# Patient Record
Sex: MALE | Race: WHITE | NOT HISPANIC OR LATINO | URBAN - METROPOLITAN AREA
[De-identification: names, ages, dates, MRNs, and addresses within clinical notes are randomized per-mention and may not be internally consistent; named-entity substitution may affect disease eponyms.]

---

## 2017-03-01 ENCOUNTER — OUTPATIENT (OUTPATIENT)
Dept: OUTPATIENT SERVICES | Facility: HOSPITAL | Age: 52
LOS: 1 days | End: 2017-03-01
Payer: COMMERCIAL

## 2017-03-01 DIAGNOSIS — R06.00 DYSPNEA, UNSPECIFIED: ICD-10-CM

## 2017-03-01 PROCEDURE — 93017 CV STRESS TEST TRACING ONLY: CPT

## 2017-03-01 PROCEDURE — 78452 HT MUSCLE IMAGE SPECT MULT: CPT

## 2017-03-01 PROCEDURE — A9502: CPT

## 2017-08-24 ENCOUNTER — OUTPATIENT (OUTPATIENT)
Dept: OUTPATIENT SERVICES | Facility: HOSPITAL | Age: 52
LOS: 1 days | End: 2017-08-24
Payer: COMMERCIAL

## 2017-08-24 VITALS
WEIGHT: 201.06 LBS | DIASTOLIC BLOOD PRESSURE: 82 MMHG | SYSTOLIC BLOOD PRESSURE: 160 MMHG | RESPIRATION RATE: 18 BRPM | HEART RATE: 68 BPM | HEIGHT: 66 IN | OXYGEN SATURATION: 97 % | TEMPERATURE: 98 F

## 2017-08-24 DIAGNOSIS — J45.909 UNSPECIFIED ASTHMA, UNCOMPLICATED: ICD-10-CM

## 2017-08-24 DIAGNOSIS — G56.00 CARPAL TUNNEL SYNDROME, UNSPECIFIED UPPER LIMB: ICD-10-CM

## 2017-08-24 DIAGNOSIS — G47.33 OBSTRUCTIVE SLEEP APNEA (ADULT) (PEDIATRIC): ICD-10-CM

## 2017-08-24 DIAGNOSIS — E78.5 HYPERLIPIDEMIA, UNSPECIFIED: ICD-10-CM

## 2017-08-24 DIAGNOSIS — I10 ESSENTIAL (PRIMARY) HYPERTENSION: ICD-10-CM

## 2017-08-24 DIAGNOSIS — Z98.890 OTHER SPECIFIED POSTPROCEDURAL STATES: Chronic | ICD-10-CM

## 2017-08-24 DIAGNOSIS — G56.02 CARPAL TUNNEL SYNDROME, LEFT UPPER LIMB: ICD-10-CM

## 2017-08-24 DIAGNOSIS — M65.312 TRIGGER THUMB, LEFT THUMB: ICD-10-CM

## 2017-08-24 DIAGNOSIS — Z01.818 ENCOUNTER FOR OTHER PREPROCEDURAL EXAMINATION: ICD-10-CM

## 2017-08-24 DIAGNOSIS — F41.9 ANXIETY DISORDER, UNSPECIFIED: ICD-10-CM

## 2017-08-24 DIAGNOSIS — Z90.89 ACQUIRED ABSENCE OF OTHER ORGANS: Chronic | ICD-10-CM

## 2017-08-24 PROCEDURE — G0463: CPT

## 2017-08-24 NOTE — H&P PST ADULT - NEGATIVE PSYCHIATRIC SYMPTOMS
no visual hallucinations/no suicidal ideation/no auditory hallucinations/no memory loss/no mood swings

## 2017-08-24 NOTE — H&P PST ADULT - NEGATIVE MUSCULOSKELETAL SYMPTOMS
no leg pain R/no back pain/no joint swelling/no arm pain L/no arm pain R/no neck pain/no leg pain L/no arthritis

## 2017-08-24 NOTE — H&P PST ADULT - HISTORY OF PRESENT ILLNESS
This is a  53 y/o male c/o tingling and numbness left hand , seen MD for evaluation, nerve conduction study revealed carpal tunnel, he presents today for surgery This is a  51 y/o male c/o tingling and numbness left hand , seen MD for evaluation, nerve conduction study revealed carpal tunnel, and also c/o left thumb trigger finger,  he presents today for surgery

## 2017-08-24 NOTE — H&P PST ADULT - PMH
Anxiety and depression    Asthma  x 10 years, stable  HTN (hypertension)  x 8 years, controlled  Hyperlipidemia

## 2017-08-24 NOTE — H&P PST ADULT - FAMILY HISTORY
Mother  Still living? No  Family history of CVA, Age at diagnosis: Age Unknown     Father  Still living? Yes, Estimated age: Age Unknown  Family history of hyperlipidemia, Age at diagnosis: Age Unknown     Sibling  Still living? Yes, Estimated age: Age Unknown  Family history of hypertension, Age at diagnosis: Age Unknown

## 2017-08-24 NOTE — H&P PST ADULT - ASSESSMENT
carpal tunnel syndrome, left upper limb carpal tunnel syndrome, left upper limb  trigger finger, left thumb

## 2017-08-24 NOTE — H&P PST ADULT - NSANTHOSAYNRD_GEN_A_CORE
No. K[place] screening performed.  STOP BANG Legend: 0-2 = LOW Risk; 3-4 = INTERMEDIATE Risk; 5-8 = HIGH Risk

## 2017-08-24 NOTE — H&P PST ADULT - NEGATIVE CARDIOVASCULAR SYMPTOMS
no claudication/no peripheral edema/no chest pain/no palpitations/no orthopnea/no dyspnea on exertion

## 2017-08-30 ENCOUNTER — OUTPATIENT (OUTPATIENT)
Dept: OUTPATIENT SERVICES | Facility: HOSPITAL | Age: 52
LOS: 1 days | Discharge: HOME | End: 2017-08-30
Payer: COMMERCIAL

## 2017-08-30 VITALS
DIASTOLIC BLOOD PRESSURE: 77 MMHG | HEART RATE: 59 BPM | SYSTOLIC BLOOD PRESSURE: 118 MMHG | RESPIRATION RATE: 16 BRPM | OXYGEN SATURATION: 95 % | TEMPERATURE: 98 F

## 2017-08-30 VITALS
HEART RATE: 76 BPM | OXYGEN SATURATION: 97 % | RESPIRATION RATE: 16 BRPM | TEMPERATURE: 98 F | DIASTOLIC BLOOD PRESSURE: 87 MMHG | WEIGHT: 201.06 LBS | HEIGHT: 66 IN | SYSTOLIC BLOOD PRESSURE: 140 MMHG

## 2017-08-30 DIAGNOSIS — Z98.890 OTHER SPECIFIED POSTPROCEDURAL STATES: Chronic | ICD-10-CM

## 2017-08-30 DIAGNOSIS — G56.02 CARPAL TUNNEL SYNDROME, LEFT UPPER LIMB: ICD-10-CM

## 2017-08-30 DIAGNOSIS — M65.312 TRIGGER THUMB, LEFT THUMB: ICD-10-CM

## 2017-08-30 DIAGNOSIS — Z90.89 ACQUIRED ABSENCE OF OTHER ORGANS: Chronic | ICD-10-CM

## 2017-08-30 PROCEDURE — 64721 CARPAL TUNNEL SURGERY: CPT | Mod: LT

## 2017-08-30 PROCEDURE — 88304 TISSUE EXAM BY PATHOLOGIST: CPT | Mod: 26

## 2017-08-30 PROCEDURE — 26055 INCISE FINGER TENDON SHEATH: CPT | Mod: F3

## 2017-08-30 PROCEDURE — 88304 TISSUE EXAM BY PATHOLOGIST: CPT

## 2017-08-30 PROCEDURE — 88305 TISSUE EXAM BY PATHOLOGIST: CPT | Mod: 26

## 2017-08-30 PROCEDURE — 88305 TISSUE EXAM BY PATHOLOGIST: CPT

## 2017-08-30 RX ORDER — OXYCODONE HYDROCHLORIDE 5 MG/1
2 TABLET ORAL
Qty: 30 | Refills: 0 | OUTPATIENT
Start: 2017-08-30

## 2017-08-30 RX ORDER — SODIUM CHLORIDE 9 MG/ML
3 INJECTION INTRAMUSCULAR; INTRAVENOUS; SUBCUTANEOUS EVERY 8 HOURS
Qty: 0 | Refills: 0 | Status: DISCONTINUED | OUTPATIENT
Start: 2017-08-30 | End: 2017-08-30

## 2017-08-30 RX ORDER — SODIUM CHLORIDE 9 MG/ML
1000 INJECTION, SOLUTION INTRAVENOUS
Qty: 0 | Refills: 0 | Status: DISCONTINUED | OUTPATIENT
Start: 2017-08-30 | End: 2017-09-07

## 2017-08-30 RX ORDER — HYDROMORPHONE HYDROCHLORIDE 2 MG/ML
0.5 INJECTION INTRAMUSCULAR; INTRAVENOUS; SUBCUTANEOUS
Qty: 0 | Refills: 0 | Status: DISCONTINUED | OUTPATIENT
Start: 2017-08-30 | End: 2017-08-30

## 2017-08-30 RX ADMIN — SODIUM CHLORIDE 3 MILLILITER(S): 9 INJECTION INTRAMUSCULAR; INTRAVENOUS; SUBCUTANEOUS at 07:22

## 2017-08-30 NOTE — BRIEF OPERATIVE NOTE - POST-OP DX
Carpal tunnel syndrome of left wrist  08/30/2017  And trigger finger left 1st,3rd and 4th digits  Active  Dominguez Carr

## 2017-08-30 NOTE — BRIEF OPERATIVE NOTE - PROCEDURE
Carpal tunnel release, left  08/30/2017  and trigger finger release left 1st,3rd and 4th digits  Active  SPILLAI

## 2017-08-30 NOTE — ASU DISCHARGE PLAN (ADULT/PEDIATRIC). - MEDICATION SUMMARY - MEDICATIONS TO TAKE
I will START or STAY ON the medications listed below when I get home from the hospital:    acetaminophen-oxyCODONE 325 mg-5 mg oral tablet  -- 2 tab(s) by mouth every 4 hours, As Needed MDD:8  -- Caution federal law prohibits the transfer of this drug to any person other  than the person for whom it was prescribed.  May cause drowsiness.  Alcohol may intensify this effect.  Use care when operating dangerous machinery.  This prescription cannot be refilled.  This product contains acetaminophen.  Do not use  with any other product containing acetaminophen to prevent possible liver damage.  Using more of this medication than prescribed may cause serious breathing problems.    -- Indication: For pain    gabapentin 100 mg oral capsule  -- 3 cap(s) by mouth once a day (at bedtime)  -- Indication: For as directed    sertraline 100 mg oral tablet  -- 1 tab(s) by mouth once a day  -- Indication: For as directed    simvastatin 40 mg oral tablet  -- 1 tab(s) by mouth once a day (at bedtime)  -- Indication: For as directed    Bystolic 10 mg oral tablet  -- 1 tab(s) by mouth once a day  -- Indication: For as directed    ProAir HFA 90 mcg/inh inhalation aerosol  -- 2 puff(s) inhaled 4 times a day, As Needed  -- Indication: For as directed    felodipine 10 mg oral tablet, extended release  -- 1 tab(s) by mouth once a day  -- Indication: For as directed    hydroCHLOROthiazide 25 mg oral tablet  -- 1 tab(s) by mouth once a day  -- Indication: For as directed

## 2017-08-30 NOTE — BRIEF OPERATIVE NOTE - PRE-OP DX
Carpal tunnel syndrome of left wrist  08/30/2017  and trigger finger left 1st,3rd,and 4th digits  Active  Dominguez Carr

## 2017-08-30 NOTE — ASU PREOP CHECKLIST - WAS PATIENT ON BETA BLOCKER?
"Chief Complaint   Patient presents with     RECHECK     Follow up to discuss overnight oximeter results       Initial Ht 1.626 m (5' 4\")  Wt 112.9 kg (249 lb)  BMI 42.74 kg/m2 Estimated body mass index is 42.74 kg/(m^2) as calculated from the following:    Height as of this encounter: 1.626 m (5' 4\").    Weight as of this encounter: 112.9 kg (249 lb).  Medication Reconciliation: complete     JONNATHAN Mark        "
No

## 2017-09-01 LAB — SURGICAL PATHOLOGY FINAL REPORT - CH: SIGNIFICANT CHANGE UP

## 2017-09-06 DIAGNOSIS — M65.842 OTHER SYNOVITIS AND TENOSYNOVITIS, LEFT HAND: ICD-10-CM

## 2017-09-06 DIAGNOSIS — I10 ESSENTIAL (PRIMARY) HYPERTENSION: ICD-10-CM

## 2017-09-06 DIAGNOSIS — M65.312 TRIGGER THUMB, LEFT THUMB: ICD-10-CM

## 2017-09-06 DIAGNOSIS — G56.02 CARPAL TUNNEL SYNDROME, LEFT UPPER LIMB: ICD-10-CM

## 2017-09-06 DIAGNOSIS — E78.5 HYPERLIPIDEMIA, UNSPECIFIED: ICD-10-CM

## 2017-09-06 DIAGNOSIS — F32.9 MAJOR DEPRESSIVE DISORDER, SINGLE EPISODE, UNSPECIFIED: ICD-10-CM

## 2017-09-06 DIAGNOSIS — J45.909 UNSPECIFIED ASTHMA, UNCOMPLICATED: ICD-10-CM

## 2017-09-06 DIAGNOSIS — M65.332 TRIGGER FINGER, LEFT MIDDLE FINGER: ICD-10-CM

## 2017-09-06 DIAGNOSIS — M65.342 TRIGGER FINGER, LEFT RING FINGER: ICD-10-CM

## 2017-09-06 DIAGNOSIS — F41.9 ANXIETY DISORDER, UNSPECIFIED: ICD-10-CM

## 2017-09-06 DIAGNOSIS — Z87.891 PERSONAL HISTORY OF NICOTINE DEPENDENCE: ICD-10-CM

## 2017-09-19 ENCOUNTER — OUTPATIENT (OUTPATIENT)
Dept: OUTPATIENT SERVICES | Facility: HOSPITAL | Age: 52
LOS: 1 days | End: 2017-09-19
Payer: COMMERCIAL

## 2017-09-19 VITALS
TEMPERATURE: 98 F | OXYGEN SATURATION: 98 % | HEART RATE: 72 BPM | SYSTOLIC BLOOD PRESSURE: 156 MMHG | RESPIRATION RATE: 16 BRPM | DIASTOLIC BLOOD PRESSURE: 91 MMHG | HEIGHT: 66 IN | WEIGHT: 195.99 LBS

## 2017-09-19 DIAGNOSIS — Z98.890 OTHER SPECIFIED POSTPROCEDURAL STATES: Chronic | ICD-10-CM

## 2017-09-19 DIAGNOSIS — G56.01 CARPAL TUNNEL SYNDROME, RIGHT UPPER LIMB: ICD-10-CM

## 2017-09-19 DIAGNOSIS — I10 ESSENTIAL (PRIMARY) HYPERTENSION: ICD-10-CM

## 2017-09-19 DIAGNOSIS — Z01.818 ENCOUNTER FOR OTHER PREPROCEDURAL EXAMINATION: ICD-10-CM

## 2017-09-19 DIAGNOSIS — J45.909 UNSPECIFIED ASTHMA, UNCOMPLICATED: ICD-10-CM

## 2017-09-19 DIAGNOSIS — Z90.89 ACQUIRED ABSENCE OF OTHER ORGANS: Chronic | ICD-10-CM

## 2017-09-19 LAB
ANION GAP SERPL CALC-SCNC: 7 MMOL/L — SIGNIFICANT CHANGE UP (ref 5–17)
BUN SERPL-MCNC: 13 MG/DL — SIGNIFICANT CHANGE UP (ref 7–18)
CALCIUM SERPL-MCNC: 9.2 MG/DL — SIGNIFICANT CHANGE UP (ref 8.4–10.5)
CHLORIDE SERPL-SCNC: 104 MMOL/L — SIGNIFICANT CHANGE UP (ref 96–108)
CO2 SERPL-SCNC: 29 MMOL/L — SIGNIFICANT CHANGE UP (ref 22–31)
CREAT SERPL-MCNC: 0.99 MG/DL — SIGNIFICANT CHANGE UP (ref 0.5–1.3)
GLUCOSE SERPL-MCNC: 102 MG/DL — HIGH (ref 70–99)
POTASSIUM SERPL-MCNC: 3.2 MMOL/L — LOW (ref 3.5–5.3)
POTASSIUM SERPL-SCNC: 3.2 MMOL/L — LOW (ref 3.5–5.3)
SODIUM SERPL-SCNC: 140 MMOL/L — SIGNIFICANT CHANGE UP (ref 135–145)

## 2017-09-19 PROCEDURE — G0463: CPT

## 2017-09-19 PROCEDURE — 80048 BASIC METABOLIC PNL TOTAL CA: CPT

## 2017-09-19 RX ORDER — SODIUM CHLORIDE 9 MG/ML
3 INJECTION INTRAMUSCULAR; INTRAVENOUS; SUBCUTANEOUS EVERY 8 HOURS
Qty: 0 | Refills: 0 | Status: DISCONTINUED | OUTPATIENT
Start: 2017-09-20 | End: 2017-09-28

## 2017-09-19 RX ORDER — ACETAMINOPHEN 500 MG
975 TABLET ORAL ONCE
Qty: 0 | Refills: 0 | Status: DISCONTINUED | OUTPATIENT
Start: 2017-09-20 | End: 2017-09-28

## 2017-09-19 RX ORDER — CELECOXIB 200 MG/1
200 CAPSULE ORAL ONCE
Qty: 0 | Refills: 0 | Status: DISCONTINUED | OUTPATIENT
Start: 2017-09-20 | End: 2017-09-28

## 2017-09-19 NOTE — H&P PST ADULT - PROBLEM SELECTOR PLAN 1
Scheduled for right carpal tunnel release on 9/20/2017. Preoperative instructions discussed and given to patient. Verbalized understanding of instructions

## 2017-09-19 NOTE — H&P PST ADULT - PSH
S/P arthroscopy of knee  bilateral (left 2008, right 2016)  S/P carpal tunnel release  left hand - 8/2017  S/P hernia repair  1984  S/P tonsillectomy

## 2017-09-19 NOTE — H&P PST ADULT - ASSESSMENT
51 y/o male with PMH of hypertension, hyperlipidemia, asthma, depression is here today for presurgical evaluation prior to surgery. Complains of burning tingling and numbness of right hand for over ten years. He was diagnosed with carpal tunnel syndrome and is scheduled for right carpal tunnel release on 9/20/2017 51 y/o male with PMH of hypertension, hyperlipidemia, asthma uncomplicated, depression (stable on medication), and prediabetes diagnosed with carpal tunnel syndrome scheduled for right carpal tunnel release on 9/20/2017. Preoperative instructions discussed and given to patient. Verbalized understanding of instructions

## 2017-09-19 NOTE — H&P PST ADULT - NEGATIVE GENERAL SYMPTOMS
no anorexia/no weight loss/no chills/no sweating/no malaise/no fatigue/no fever/no weight gain/no polyphagia/no polyuria/no polydipsia

## 2017-09-19 NOTE — H&P PST ADULT - PMH
Anxiety and depression    Asthma  x 10 years, stable  HTN (hypertension)  x 8 years, controlled  Hyperlipidemia    Seasonal allergic rhinitis, unspecified chronicity, unspecified trigger

## 2017-09-19 NOTE — H&P PST ADULT - HISTORY OF PRESENT ILLNESS
51 y/o male with PMH of hypertension, hyperlipidemia, asthma, depression is here today for presurgical evaluation prior to surgery. Complains of burning tingling and numbness of right hand for over ten years. He was diagnosed with carpal tunnel syndrome and is scheduled for right carpal tunnel release on 9/20/2017

## 2017-09-19 NOTE — H&P PST ADULT - PROBLEM SELECTOR PLAN 2
Instructed to take antihypertensive medication as prescribed with a sip of water the morning of surgery and to follow up with PCP for management of hypertension and other comorbid conditions

## 2017-09-19 NOTE — H&P PST ADULT - LAST ECHOCARDIOGRAM
2/2017: reports normal heart function 2/2017: Grade 1 diastolic dysfunction, mild tricuspid, trace mitral, trace pulmonic regurgitation, LVEF 60%

## 2017-09-19 NOTE — H&P PST ADULT - PROBLEM SELECTOR PLAN 3
Instructed to use ProAir as needed the morning of surgery and to bring medication with him the morning of surgery

## 2017-09-19 NOTE — H&P PST ADULT - RS GEN PE MLT RESP DETAILS PC
no rhonchi/good air movement/no intercostal retractions/clear to auscultation bilaterally/airway patent/breath sounds equal/no rales/normal/no chest wall tenderness/respirations non-labored

## 2017-09-19 NOTE — H&P PST ADULT - SKIN
detailed exam +granuloma -left hand/swelling/warm and dry swelling/warm and dry/healed surgical scar left wrist, +granuloma -left hand

## 2017-09-20 ENCOUNTER — OUTPATIENT (OUTPATIENT)
Dept: OUTPATIENT SERVICES | Facility: HOSPITAL | Age: 52
LOS: 1 days | Discharge: HOME | End: 2017-09-20
Payer: COMMERCIAL

## 2017-09-20 VITALS
OXYGEN SATURATION: 99 % | TEMPERATURE: 98 F | SYSTOLIC BLOOD PRESSURE: 132 MMHG | DIASTOLIC BLOOD PRESSURE: 94 MMHG | HEIGHT: 66 IN | WEIGHT: 195.99 LBS | HEART RATE: 72 BPM | RESPIRATION RATE: 16 BRPM

## 2017-09-20 VITALS
HEART RATE: 71 BPM | RESPIRATION RATE: 16 BRPM | TEMPERATURE: 98 F | SYSTOLIC BLOOD PRESSURE: 142 MMHG | DIASTOLIC BLOOD PRESSURE: 83 MMHG | OXYGEN SATURATION: 98 %

## 2017-09-20 DIAGNOSIS — Z90.89 ACQUIRED ABSENCE OF OTHER ORGANS: Chronic | ICD-10-CM

## 2017-09-20 DIAGNOSIS — Z01.818 ENCOUNTER FOR OTHER PREPROCEDURAL EXAMINATION: ICD-10-CM

## 2017-09-20 DIAGNOSIS — G56.01 CARPAL TUNNEL SYNDROME, RIGHT UPPER LIMB: ICD-10-CM

## 2017-09-20 DIAGNOSIS — Z98.890 OTHER SPECIFIED POSTPROCEDURAL STATES: Chronic | ICD-10-CM

## 2017-09-20 PROCEDURE — 64721 CARPAL TUNNEL SURGERY: CPT | Mod: RT

## 2017-09-20 PROCEDURE — 88305 TISSUE EXAM BY PATHOLOGIST: CPT | Mod: 26

## 2017-09-20 PROCEDURE — 88305 TISSUE EXAM BY PATHOLOGIST: CPT

## 2017-09-20 RX ORDER — OXYCODONE AND ACETAMINOPHEN 5; 325 MG/1; MG/1
1 TABLET ORAL EVERY 4 HOURS
Qty: 0 | Refills: 0 | Status: DISCONTINUED | OUTPATIENT
Start: 2017-09-20 | End: 2017-09-20

## 2017-09-20 RX ORDER — SODIUM CHLORIDE 9 MG/ML
1000 INJECTION, SOLUTION INTRAVENOUS
Qty: 0 | Refills: 0 | Status: DISCONTINUED | OUTPATIENT
Start: 2017-09-20 | End: 2017-09-28

## 2017-09-20 RX ORDER — GABAPENTIN 400 MG/1
3 CAPSULE ORAL
Qty: 0 | Refills: 0 | COMMUNITY

## 2017-09-20 RX ORDER — SODIUM CHLORIDE 9 MG/ML
1000 INJECTION, SOLUTION INTRAVENOUS
Qty: 0 | Refills: 0 | Status: DISCONTINUED | OUTPATIENT
Start: 2017-09-20 | End: 2017-09-20

## 2017-09-20 RX ORDER — ALBUTEROL 90 UG/1
2 AEROSOL, METERED ORAL
Qty: 0 | Refills: 0 | COMMUNITY

## 2017-09-20 RX ORDER — SERTRALINE 25 MG/1
1 TABLET, FILM COATED ORAL
Qty: 0 | Refills: 0 | COMMUNITY

## 2017-09-20 RX ORDER — NEBIVOLOL HYDROCHLORIDE 5 MG/1
1 TABLET ORAL
Qty: 0 | Refills: 0 | COMMUNITY

## 2017-09-20 RX ORDER — HYDROMORPHONE HYDROCHLORIDE 2 MG/ML
0.5 INJECTION INTRAMUSCULAR; INTRAVENOUS; SUBCUTANEOUS
Qty: 0 | Refills: 0 | Status: DISCONTINUED | OUTPATIENT
Start: 2017-09-20 | End: 2017-09-20

## 2017-09-20 RX ORDER — ONDANSETRON 8 MG/1
4 TABLET, FILM COATED ORAL EVERY 6 HOURS
Qty: 0 | Refills: 0 | Status: DISCONTINUED | OUTPATIENT
Start: 2017-09-20 | End: 2017-09-28

## 2017-09-20 RX ORDER — FELODIPINE 5 MG/1
1 TABLET, FILM COATED, EXTENDED RELEASE ORAL
Qty: 0 | Refills: 0 | COMMUNITY

## 2017-09-20 RX ORDER — OXYCODONE HYDROCHLORIDE 5 MG/1
2 TABLET ORAL
Qty: 30 | Refills: 0 | OUTPATIENT
Start: 2017-09-20

## 2017-09-20 RX ORDER — SIMVASTATIN 20 MG/1
1 TABLET, FILM COATED ORAL
Qty: 0 | Refills: 0 | COMMUNITY

## 2017-09-20 RX ORDER — OXYCODONE AND ACETAMINOPHEN 5; 325 MG/1; MG/1
2 TABLET ORAL EVERY 6 HOURS
Qty: 0 | Refills: 0 | Status: DISCONTINUED | OUTPATIENT
Start: 2017-09-20 | End: 2017-09-20

## 2017-09-20 NOTE — ASU DISCHARGE PLAN (ADULT/PEDIATRIC). - NOTIFY
Numbness, color, or temperature change to extremity/Fever greater than 101/Pain not relieved by Medications Numbness, color, or temperature change to extremity/Pain not relieved by Medications/Fever greater than 101/Bleeding that does not stop

## 2017-09-20 NOTE — ASU DISCHARGE PLAN (ADULT/PEDIATRIC). - ACTIVITY LEVEL
elevate extremity/Arm sling, Rey pillow elevate extremity/no exercise/Arm sling, Rey pillow/no sports/gym/no heavy lifting

## 2017-09-20 NOTE — BRIEF OPERATIVE NOTE - PROCEDURE
<<-----Click on this checkbox to enter Procedure Carpal tunnel release, right  09/20/2017  And synovectomy  Active  SPILLAI

## 2017-09-20 NOTE — ASU DISCHARGE PLAN (ADULT/PEDIATRIC). - NURSING INSTRUCTIONS
Keep dressing DRY and Clean until Sunday. On Sunday please REMOVE ONLY the ACE bandage (pink elastic wrap)  and hard board. Leave the white gauze dressing until you see the Doctor in 2 weeks.

## 2017-09-22 LAB — SURGICAL PATHOLOGY FINAL REPORT - CH: SIGNIFICANT CHANGE UP

## 2017-09-28 DIAGNOSIS — E78.5 HYPERLIPIDEMIA, UNSPECIFIED: ICD-10-CM

## 2017-09-28 DIAGNOSIS — F17.210 NICOTINE DEPENDENCE, CIGARETTES, UNCOMPLICATED: ICD-10-CM

## 2017-09-28 DIAGNOSIS — I10 ESSENTIAL (PRIMARY) HYPERTENSION: ICD-10-CM

## 2017-09-28 DIAGNOSIS — J45.909 UNSPECIFIED ASTHMA, UNCOMPLICATED: ICD-10-CM

## 2017-09-28 DIAGNOSIS — G56.01 CARPAL TUNNEL SYNDROME, RIGHT UPPER LIMB: ICD-10-CM

## 2017-09-28 DIAGNOSIS — M65.831 OTHER SYNOVITIS AND TENOSYNOVITIS, RIGHT FOREARM: ICD-10-CM

## 2023-10-20 NOTE — ASU PREOP CHECKLIST - MEDICAL/PEDIATRIC CLEARANCE ON MEDICAL RECORD
Keep moving. Prolonged sitting could worsen your pain.    See your PCP as scheduled.    Do not take advil or aleve with naproxen Rx.    Wear the lidocaine patch 12 hours on and 12 hours off. Do not take a shower or use a heating pad while patch is on.   in chart

## 2023-11-04 NOTE — H&P PST ADULT - NEGATIVE CARDIOVASCULAR SYMPTOMS
Medical decision making no palpitations/no dyspnea on exertion/no orthopnea/no paroxysmal nocturnal dyspnea/no chest pain/no peripheral edema/no claudication

## 2025-04-08 NOTE — ASU PATIENT PROFILE, ADULT - AS SC BRADEN FRICTION
Quality 226: Preventive Care And Screening: Tobacco Use: Screening And Cessation Intervention: Patient screened for tobacco use and is an ex/non-smoker Detail Level: Detailed (3) no apparent problem